# Patient Record
Sex: FEMALE | NOT HISPANIC OR LATINO | Employment: UNEMPLOYED | URBAN - NONMETROPOLITAN AREA
[De-identification: names, ages, dates, MRNs, and addresses within clinical notes are randomized per-mention and may not be internally consistent; named-entity substitution may affect disease eponyms.]

---

## 2020-06-11 ENCOUNTER — TRANSCRIBE ORDERS (OUTPATIENT)
Dept: ADMINISTRATIVE | Facility: HOSPITAL | Age: 50
End: 2020-06-11

## 2020-06-11 DIAGNOSIS — Z01.818 PREOP TESTING: Primary | ICD-10-CM

## 2021-05-26 ENCOUNTER — TRANSCRIBE ORDERS (OUTPATIENT)
Dept: ADMINISTRATIVE | Facility: HOSPITAL | Age: 51
End: 2021-05-26

## 2021-05-26 DIAGNOSIS — R10.9 ABDOMINAL PAIN, UNSPECIFIED ABDOMINAL LOCATION: Primary | ICD-10-CM

## 2024-11-23 ENCOUNTER — HOSPITAL ENCOUNTER (OUTPATIENT)
Dept: OTHER | Facility: HOSPITAL | Age: 54
Discharge: HOME | End: 2024-11-23

## 2024-11-26 ENCOUNTER — PREP FOR SURGERY (OUTPATIENT)
Dept: OTHER | Facility: HOSPITAL | Age: 54
End: 2024-11-26
Payer: MEDICARE

## 2024-11-26 ENCOUNTER — HOSPITAL ENCOUNTER (OUTPATIENT)
Facility: HOSPITAL | Age: 54
Setting detail: HOSPITAL OUTPATIENT SURGERY
Discharge: HOME | End: 2024-11-26
Attending: UROLOGY | Admitting: UROLOGY
Payer: MEDICARE

## 2024-11-26 ENCOUNTER — APPOINTMENT (OUTPATIENT)
Dept: GENERAL RADIOLOGY | Facility: HOSPITAL | Age: 54
End: 2024-11-26
Payer: MEDICARE

## 2024-11-26 ENCOUNTER — HOSPITAL ENCOUNTER (OUTPATIENT)
Dept: OTHER | Facility: HOSPITAL | Age: 54
Setting detail: HOSPITAL OUTPATIENT SURGERY
Discharge: HOME | End: 2024-11-26
Payer: MEDICARE

## 2024-11-26 ENCOUNTER — ANESTHESIA EVENT (OUTPATIENT)
Dept: PERIOP | Facility: HOSPITAL | Age: 54
End: 2024-11-26
Payer: MEDICARE

## 2024-11-26 ENCOUNTER — ANESTHESIA (OUTPATIENT)
Dept: PERIOP | Facility: HOSPITAL | Age: 54
End: 2024-11-26
Payer: MEDICARE

## 2024-11-26 VITALS
SYSTOLIC BLOOD PRESSURE: 149 MMHG | DIASTOLIC BLOOD PRESSURE: 88 MMHG | OXYGEN SATURATION: 100 % | BODY MASS INDEX: 41.32 KG/M2 | HEIGHT: 65 IN | TEMPERATURE: 97.6 F | RESPIRATION RATE: 16 BRPM | WEIGHT: 248 LBS | HEART RATE: 73 BPM

## 2024-11-26 DIAGNOSIS — N20.1 LEFT URETERAL STONE: Primary | ICD-10-CM

## 2024-11-26 DIAGNOSIS — N20.1 LEFT URETERAL STONE: ICD-10-CM

## 2024-11-26 PROCEDURE — 88300 SURGICAL PATH GROSS: CPT | Performed by: UROLOGY

## 2024-11-26 PROCEDURE — 25810000003 LACTATED RINGERS PER 1000 ML: Performed by: NURSE ANESTHETIST, CERTIFIED REGISTERED

## 2024-11-26 PROCEDURE — 74420 UROGRAPHY RTRGR +-KUB: CPT | Performed by: UROLOGY

## 2024-11-26 PROCEDURE — 25010000002 FENTANYL CITRATE (PF) 50 MCG/ML SOLUTION: Performed by: NURSE ANESTHETIST, CERTIFIED REGISTERED

## 2024-11-26 PROCEDURE — 25010000002 MIDAZOLAM PER 1MG: Performed by: ANESTHESIOLOGY

## 2024-11-26 PROCEDURE — 82365 CALCULUS SPECTROSCOPY: CPT | Performed by: UROLOGY

## 2024-11-26 PROCEDURE — 76000 FLUOROSCOPY <1 HR PHYS/QHP: CPT

## 2024-11-26 PROCEDURE — 25010000002 ONDANSETRON PER 1 MG

## 2024-11-26 PROCEDURE — C1758 CATHETER, URETERAL: HCPCS | Performed by: UROLOGY

## 2024-11-26 PROCEDURE — 25010000002 PROPOFOL 10 MG/ML EMULSION: Performed by: NURSE ANESTHETIST, CERTIFIED REGISTERED

## 2024-11-26 PROCEDURE — S0260 H&P FOR SURGERY: HCPCS | Performed by: UROLOGY

## 2024-11-26 PROCEDURE — 25010000002 HYDROMORPHONE PER 4 MG: Performed by: NURSE ANESTHETIST, CERTIFIED REGISTERED

## 2024-11-26 PROCEDURE — C1769 GUIDE WIRE: HCPCS | Performed by: UROLOGY

## 2024-11-26 PROCEDURE — 25010000002 SUGAMMADEX 200 MG/2ML SOLUTION

## 2024-11-26 PROCEDURE — 25010000002 DEXAMETHASONE PER 1 MG: Performed by: NURSE ANESTHETIST, CERTIFIED REGISTERED

## 2024-11-26 PROCEDURE — 25010000002 LIDOCAINE PF 2% 2 % SOLUTION: Performed by: NURSE ANESTHETIST, CERTIFIED REGISTERED

## 2024-11-26 PROCEDURE — C2617 STENT, NON-COR, TEM W/O DEL: HCPCS | Performed by: UROLOGY

## 2024-11-26 PROCEDURE — 25010000002 CEFTRIAXONE PER 250 MG: Performed by: UROLOGY

## 2024-11-26 PROCEDURE — 52356 CYSTO/URETERO W/LITHOTRIPSY: CPT | Performed by: UROLOGY

## 2024-11-26 PROCEDURE — 25810000003 LACTATED RINGERS PER 1000 ML: Performed by: ANESTHESIOLOGY

## 2024-11-26 DEVICE — URETERAL STENT
Type: IMPLANTABLE DEVICE | Site: URETER | Status: FUNCTIONAL
Brand: ASCERTA™

## 2024-11-26 RX ORDER — KETOROLAC TROMETHAMINE 30 MG/ML
30 INJECTION, SOLUTION INTRAMUSCULAR; INTRAVENOUS ONCE AS NEEDED
Status: DISCONTINUED | OUTPATIENT
Start: 2024-11-26 | End: 2024-11-27 | Stop reason: HOSPADM

## 2024-11-26 RX ORDER — ACETAMINOPHEN 325 MG/1
650 TABLET ORAL ONCE
Status: COMPLETED | OUTPATIENT
Start: 2024-11-26 | End: 2024-11-26

## 2024-11-26 RX ORDER — HYDROCODONE BITARTRATE AND ACETAMINOPHEN 7.5; 325 MG/1; MG/1
2 TABLET ORAL EVERY 6 HOURS PRN
Status: DISCONTINUED | OUTPATIENT
Start: 2024-11-26 | End: 2024-11-27 | Stop reason: HOSPADM

## 2024-11-26 RX ORDER — HYDROCODONE BITARTRATE AND ACETAMINOPHEN 7.5; 325 MG/1; MG/1
1 TABLET ORAL EVERY 6 HOURS PRN
COMMUNITY

## 2024-11-26 RX ORDER — OXYCODONE AND ACETAMINOPHEN 7.5; 325 MG/1; MG/1
1 TABLET ORAL ONCE AS NEEDED
Status: DISCONTINUED | OUTPATIENT
Start: 2024-11-26 | End: 2024-11-26 | Stop reason: HOSPADM

## 2024-11-26 RX ORDER — HYDROMORPHONE HYDROCHLORIDE 1 MG/ML
0.5 INJECTION, SOLUTION INTRAMUSCULAR; INTRAVENOUS; SUBCUTANEOUS
Status: DISCONTINUED | OUTPATIENT
Start: 2024-11-26 | End: 2024-11-26 | Stop reason: HOSPADM

## 2024-11-26 RX ORDER — PHENAZOPYRIDINE HYDROCHLORIDE 100 MG/1
100 TABLET, FILM COATED ORAL 3 TIMES DAILY PRN
Status: DISCONTINUED | OUTPATIENT
Start: 2024-11-26 | End: 2024-11-27 | Stop reason: HOSPADM

## 2024-11-26 RX ORDER — LIDOCAINE HYDROCHLORIDE 20 MG/ML
INJECTION, SOLUTION EPIDURAL; INFILTRATION; INTRACAUDAL; PERINEURAL AS NEEDED
Status: DISCONTINUED | OUTPATIENT
Start: 2024-11-26 | End: 2024-11-26 | Stop reason: SURG

## 2024-11-26 RX ORDER — IBUPROFEN 600 MG/1
600 TABLET, FILM COATED ORAL EVERY 6 HOURS PRN
Status: DISCONTINUED | OUTPATIENT
Start: 2024-11-26 | End: 2024-11-26 | Stop reason: HOSPADM

## 2024-11-26 RX ORDER — ONDANSETRON 2 MG/ML
4 INJECTION INTRAMUSCULAR; INTRAVENOUS ONCE AS NEEDED
Status: DISCONTINUED | OUTPATIENT
Start: 2024-11-26 | End: 2024-11-26 | Stop reason: HOSPADM

## 2024-11-26 RX ORDER — OXYCODONE HYDROCHLORIDE 5 MG/1
5 TABLET ORAL
Status: DISCONTINUED | OUTPATIENT
Start: 2024-11-26 | End: 2024-11-26 | Stop reason: HOSPADM

## 2024-11-26 RX ORDER — TAMSULOSIN HYDROCHLORIDE 0.4 MG/1
1 CAPSULE ORAL DAILY
Qty: 14 CAPSULE | Refills: 0 | Status: SHIPPED | OUTPATIENT
Start: 2024-11-26

## 2024-11-26 RX ORDER — PHENAZOPYRIDINE HYDROCHLORIDE 200 MG/1
200 TABLET, FILM COATED ORAL 3 TIMES DAILY PRN
Qty: 20 TABLET | Refills: 0 | Status: SHIPPED | OUTPATIENT
Start: 2024-11-26

## 2024-11-26 RX ORDER — MAGNESIUM HYDROXIDE 1200 MG/15ML
LIQUID ORAL AS NEEDED
Status: DISCONTINUED | OUTPATIENT
Start: 2024-11-26 | End: 2024-11-26 | Stop reason: HOSPADM

## 2024-11-26 RX ORDER — BUSPIRONE HYDROCHLORIDE 10 MG/1
50 TABLET ORAL ONCE
COMMUNITY
End: 2024-12-02 | Stop reason: SDUPTHER

## 2024-11-26 RX ORDER — PROPOFOL 10 MG/ML
VIAL (ML) INTRAVENOUS AS NEEDED
Status: DISCONTINUED | OUTPATIENT
Start: 2024-11-26 | End: 2024-11-26 | Stop reason: SURG

## 2024-11-26 RX ORDER — ONDANSETRON 2 MG/ML
4 INJECTION INTRAMUSCULAR; INTRAVENOUS ONCE AS NEEDED
Status: DISCONTINUED | OUTPATIENT
Start: 2024-11-26 | End: 2024-11-27 | Stop reason: HOSPADM

## 2024-11-26 RX ORDER — PROMETHAZINE HYDROCHLORIDE 12.5 MG/1
12.5 TABLET ORAL ONCE AS NEEDED
Status: DISCONTINUED | OUTPATIENT
Start: 2024-11-26 | End: 2024-11-27 | Stop reason: HOSPADM

## 2024-11-26 RX ORDER — SODIUM CHLORIDE, SODIUM LACTATE, POTASSIUM CHLORIDE, CALCIUM CHLORIDE 600; 310; 30; 20 MG/100ML; MG/100ML; MG/100ML; MG/100ML
INJECTION, SOLUTION INTRAVENOUS CONTINUOUS PRN
Status: DISCONTINUED | OUTPATIENT
Start: 2024-11-26 | End: 2024-11-26 | Stop reason: SURG

## 2024-11-26 RX ORDER — SCOLOPAMINE TRANSDERMAL SYSTEM 1 MG/1
1 PATCH, EXTENDED RELEASE TRANSDERMAL ONCE
Status: DISCONTINUED | OUTPATIENT
Start: 2024-11-26 | End: 2024-11-27 | Stop reason: HOSPADM

## 2024-11-26 RX ORDER — FENTANYL CITRATE 50 UG/ML
INJECTION, SOLUTION INTRAMUSCULAR; INTRAVENOUS AS NEEDED
Status: DISCONTINUED | OUTPATIENT
Start: 2024-11-26 | End: 2024-11-26 | Stop reason: SURG

## 2024-11-26 RX ORDER — HYDROMORPHONE HYDROCHLORIDE 1 MG/ML
0.25 INJECTION, SOLUTION INTRAMUSCULAR; INTRAVENOUS; SUBCUTANEOUS
Status: DISCONTINUED | OUTPATIENT
Start: 2024-11-26 | End: 2024-11-26 | Stop reason: HOSPADM

## 2024-11-26 RX ORDER — ALBUTEROL SULFATE 90 UG/1
2 INHALANT RESPIRATORY (INHALATION) EVERY 6 HOURS PRN
COMMUNITY

## 2024-11-26 RX ORDER — MEPERIDINE HYDROCHLORIDE 25 MG/ML
12.5 INJECTION INTRAMUSCULAR; INTRAVENOUS; SUBCUTANEOUS
Status: DISCONTINUED | OUTPATIENT
Start: 2024-11-26 | End: 2024-11-26 | Stop reason: HOSPADM

## 2024-11-26 RX ORDER — ROCURONIUM BROMIDE 10 MG/ML
INJECTION, SOLUTION INTRAVENOUS AS NEEDED
Status: DISCONTINUED | OUTPATIENT
Start: 2024-11-26 | End: 2024-11-26 | Stop reason: SURG

## 2024-11-26 RX ORDER — OXYCODONE HYDROCHLORIDE 5 MG/1
5-10 TABLET ORAL EVERY 6 HOURS PRN
Qty: 14 TABLET | Refills: 0 | Status: SHIPPED | OUTPATIENT
Start: 2024-11-26

## 2024-11-26 RX ORDER — PROMETHAZINE HYDROCHLORIDE 25 MG/1
25 SUPPOSITORY RECTAL ONCE AS NEEDED
Status: DISCONTINUED | OUTPATIENT
Start: 2024-11-26 | End: 2024-11-26 | Stop reason: HOSPADM

## 2024-11-26 RX ORDER — SODIUM CHLORIDE, SODIUM LACTATE, POTASSIUM CHLORIDE, CALCIUM CHLORIDE 600; 310; 30; 20 MG/100ML; MG/100ML; MG/100ML; MG/100ML
9 INJECTION, SOLUTION INTRAVENOUS ONCE
Status: COMPLETED | OUTPATIENT
Start: 2024-11-26 | End: 2024-11-26

## 2024-11-26 RX ORDER — MIDAZOLAM HYDROCHLORIDE 2 MG/2ML
2 INJECTION, SOLUTION INTRAMUSCULAR; INTRAVENOUS ONCE
Status: COMPLETED | OUTPATIENT
Start: 2024-11-26 | End: 2024-11-26

## 2024-11-26 RX ORDER — OXYCODONE HYDROCHLORIDE 5 MG/1
5 TABLET ORAL EVERY 4 HOURS PRN
Status: DISCONTINUED | OUTPATIENT
Start: 2024-11-26 | End: 2024-11-27 | Stop reason: HOSPADM

## 2024-11-26 RX ORDER — OXYBUTYNIN CHLORIDE 5 MG/1
5 TABLET ORAL 3 TIMES DAILY PRN
Qty: 12 TABLET | Refills: 0 | Status: SHIPPED | OUTPATIENT
Start: 2024-11-26

## 2024-11-26 RX ORDER — MELOXICAM 15 MG/1
15 TABLET ORAL DAILY
COMMUNITY

## 2024-11-26 RX ORDER — ONDANSETRON 2 MG/ML
INJECTION INTRAMUSCULAR; INTRAVENOUS AS NEEDED
Status: DISCONTINUED | OUTPATIENT
Start: 2024-11-26 | End: 2024-11-26 | Stop reason: SURG

## 2024-11-26 RX ORDER — ONDANSETRON 4 MG/1
4 TABLET, ORALLY DISINTEGRATING ORAL ONCE AS NEEDED
Status: DISCONTINUED | OUTPATIENT
Start: 2024-11-26 | End: 2024-11-27 | Stop reason: HOSPADM

## 2024-11-26 RX ORDER — DEXAMETHASONE SODIUM PHOSPHATE 4 MG/ML
INJECTION, SOLUTION INTRA-ARTICULAR; INTRALESIONAL; INTRAMUSCULAR; INTRAVENOUS; SOFT TISSUE AS NEEDED
Status: DISCONTINUED | OUTPATIENT
Start: 2024-11-26 | End: 2024-11-26 | Stop reason: SURG

## 2024-11-26 RX ORDER — PROMETHAZINE HYDROCHLORIDE 12.5 MG/1
25 TABLET ORAL ONCE AS NEEDED
Status: DISCONTINUED | OUTPATIENT
Start: 2024-11-26 | End: 2024-11-26 | Stop reason: HOSPADM

## 2024-11-26 RX ORDER — OXYBUTYNIN CHLORIDE 5 MG/1
5 TABLET ORAL 3 TIMES DAILY PRN
Status: DISCONTINUED | OUTPATIENT
Start: 2024-11-26 | End: 2024-11-27 | Stop reason: HOSPADM

## 2024-11-26 RX ORDER — SERTRALINE HYDROCHLORIDE 100 MG/1
100 TABLET, FILM COATED ORAL DAILY
COMMUNITY
End: 2024-12-02 | Stop reason: SDUPTHER

## 2024-11-26 RX ORDER — FUROSEMIDE 40 MG/1
40 TABLET ORAL AS NEEDED
COMMUNITY

## 2024-11-26 RX ADMIN — OXYCODONE 5 MG: 5 TABLET ORAL at 21:51

## 2024-11-26 RX ADMIN — SCOPALAMINE 1 PATCH: 1 PATCH, EXTENDED RELEASE TRANSDERMAL at 17:58

## 2024-11-26 RX ADMIN — SODIUM CHLORIDE, POTASSIUM CHLORIDE, SODIUM LACTATE AND CALCIUM CHLORIDE: 600; 310; 30; 20 INJECTION, SOLUTION INTRAVENOUS at 18:02

## 2024-11-26 RX ADMIN — SUGAMMADEX 200 MG: 100 INJECTION, SOLUTION INTRAVENOUS at 19:21

## 2024-11-26 RX ADMIN — OXYBUTYNIN CHLORIDE 5 MG: 5 TABLET ORAL at 20:53

## 2024-11-26 RX ADMIN — HYDROMORPHONE HYDROCHLORIDE 0.5 MG: 1 INJECTION, SOLUTION INTRAMUSCULAR; INTRAVENOUS; SUBCUTANEOUS at 19:44

## 2024-11-26 RX ADMIN — ACETAMINOPHEN 650 MG: 325 TABLET ORAL at 20:11

## 2024-11-26 RX ADMIN — LIDOCAINE HYDROCHLORIDE 60 MG: 20 INJECTION, SOLUTION INTRAVENOUS at 18:08

## 2024-11-26 RX ADMIN — FENTANYL CITRATE 50 MCG: 50 INJECTION, SOLUTION INTRAMUSCULAR; INTRAVENOUS at 18:18

## 2024-11-26 RX ADMIN — SODIUM CHLORIDE 1 G: 9 INJECTION INTRAMUSCULAR; INTRAVENOUS; SUBCUTANEOUS at 18:12

## 2024-11-26 RX ADMIN — HYDROCODONE BITARTRATE AND ACETAMINOPHEN 2 TABLET: 7.5; 325 TABLET ORAL at 22:51

## 2024-11-26 RX ADMIN — PROPOFOL 150 MG: 10 INJECTION, EMULSION INTRAVENOUS at 18:08

## 2024-11-26 RX ADMIN — ROCURONIUM BROMIDE 50 MG: 10 INJECTION, SOLUTION INTRAVENOUS at 18:08

## 2024-11-26 RX ADMIN — ONDANSETRON HYDROCHLORIDE 4 MG: 2 SOLUTION INTRAMUSCULAR; INTRAVENOUS at 19:16

## 2024-11-26 RX ADMIN — MIDAZOLAM HYDROCHLORIDE 2 MG: 1 INJECTION, SOLUTION INTRAMUSCULAR; INTRAVENOUS at 17:58

## 2024-11-26 RX ADMIN — DEXAMETHASONE SODIUM PHOSPHATE 8 MG: 4 INJECTION, SOLUTION INTRAMUSCULAR; INTRAVENOUS at 18:03

## 2024-11-26 RX ADMIN — FENTANYL CITRATE 50 MCG: 50 INJECTION, SOLUTION INTRAMUSCULAR; INTRAVENOUS at 18:08

## 2024-11-26 RX ADMIN — SODIUM CHLORIDE, SODIUM LACTATE, POTASSIUM CHLORIDE, CALCIUM CHLORIDE 9 ML/HR: 20; 30; 600; 310 INJECTION, SOLUTION INTRAVENOUS at 17:58

## 2024-11-26 NOTE — ANESTHESIA PREPROCEDURE EVALUATION
Anesthesia Evaluation     Patient summary reviewed and Nursing notes reviewed   no history of anesthetic complications:   NPO Solid Status: > 8 hours  NPO Liquid Status: > 2 hours           Airway   Mallampati: II  TM distance: >3 FB  Neck ROM: full  Dental - normal exam     Pulmonary - normal exam   (+) asthma,  Cardiovascular - normal exam  Exercise tolerance: good (4-7 METS)    (+) hypertension      Neuro/Psych- negative ROS  GI/Hepatic/Renal/Endo    (+) obesity, renal disease- stones    Musculoskeletal (-) negative ROS    Abdominal   (+) obese   Substance History - negative use     OB/GYN negative ob/gyn ROS         Other - negative ROS       ROS/Med Hx Other: PAT Nursing Notes unavailable.                     Anesthesia Plan    ASA 2     general   Rapid sequence  (Currently N/V. +ETT)  intravenous induction     Anesthetic plan, risks, benefits, and alternatives have been provided, discussed and informed consent has been obtained with: patient.    Plan discussed with CRNA.        CODE STATUS:

## 2024-11-26 NOTE — H&P
[facility]   UROLOGY Consult Note    Patient Name: Chasidy Frances  : 1970  MRN: [id number]  Primary Care Physician:  Khari Cantu CRNA  Referring Physician: Madison Hope MD  Date of admission: 2024    Subjective   Subjective     Reason for Consult/ Chief Complaint: Left ureteral stone, refractory pain    HPI:  Chasidy Frances is a 54 y.o. female presenting from outside ER after presentation for acute, severe left-sided flank pain.  Refractory pain in ER.  Patient denies fever.    Workup at outside ER reveals UA with 10-25 RBCs, no bacteria, nitrite negative, negative leukocyte esterase.  WBC 7.8; creatinine 0.9.    CT abdomen pelvis without contrast 2024 reveals a 10 mm obstructing stone in the mid left ureter with mild ureteral dilation and hydronephrosis; no other stones visualized.    Urology consulted via transfer center, patient was discharged and presents here for definitive stone management.      Review of Systems   All systems were reviewed and negative except for: The above    Personal History     No past medical history on file.    No past surgical history on file.    Family History: family history is not on file. Otherwise pertinent FHx was reviewed and not pertinent to current issue.    Social History:      Home Medications:       Allergies:  Not on File    Objective    Objective     Vitals:        Physical Exam:   No acute distress, well-nourished  Lungs: Clear, unlabored  CV: Regular rate, no chest retractions  Awake alert and oriented  Mood normal; affect normal    Result Review    Result Review:  I have personally reviewed the results from the time of this admission to 2024 17:08 EST and agree with these findings:  [x]  Laboratory  []  Microbiology  [x]  Radiology  []  EKG/Telemetry   []  Cardiology/Vascular   []  Pathology  [x]  Old records  []  Other:      Assessment & Plan   Assessment / Plan     Brief Patient Summary:  Chasidy Frances is a 54 y.o.  female who presents for surgical management of left obstructive uropathy secondary to a 10 mm mid ureteral stone with refractory symptoms at outside hospital     active Hospital Problems:  Active Hospital Problems    Diagnosis     **Left ureteral stone        Plan:   Left ureteral calculi-approximately 10 mm in size.  CT imaging reviewed and discussed with patient.  Patient with refractory symptoms at outside ER.   Fortunately, no evidence of UTI or leukocytosis.    Plan to proceed urgently with cystoscopy, left retrograde pyelogram, ureteroscopy, laser lithotripsy, stent placement.  Risks, benefits and alternatives were discussed with patient including bleeding, infection, damage to surrounding structures, stone recurrence, stricture pain, further procedures or management, and risks of anesthesia including up to death.  Patient also notes understanding that if unable to reach the level of the stone or if significant purulent discharge noted upon initiation of procedure that stent will be placed in definitive stone management will be deferred until the collecting system is optimized.    Patient participated in and notes understanding of the above discussion and is agreeable to proceed.  Anticipate likely discharge home depending on clinical course  Left side marked   Preop Rocephin   OR notified   all question addressed at this time.    Electronically signed by Madison Hope MD, 11/26/24, 5:05 PM EST.

## 2024-11-27 ENCOUNTER — TELEPHONE (OUTPATIENT)
Dept: UROLOGY | Age: 54
End: 2024-11-27
Payer: MEDICARE

## 2024-11-27 NOTE — OP NOTE
Preoperative diagnosis  Left ureteral stone     Postoperative diagnosis  Left ureteral stone    Procedure performed  Cystoscopy, bilateral retrograde pyelogram, left ureteroscopy, laser lithotripsy, ureteral stent insertion     Attending surgeon  Madison Hope MD     Anesthesia  General    EBL  0 mL    Complications  None    Specimen  Left ureteral Stone    Findings  Cystoscopy without abnormality, bilateral orthotopic ureters  Treatment of left ureteral stone with laser, fragments retrieved; no significant stone burden at conclusion of case the sediment and dust  6 x 26 ureteral stent no string    Indications  54 y.o. female agreed to undergo the above named procedure after discussion of the alternatives, risks and benefits.   Informed consent was obtained.      Procedure  After informed consent was obtained, the patient was taken back to the  operating suite where general anesthesia was administered. Once the patient  was adequately anesthetized, they were placed in the dorsal lithotomy position.  The genitals were prepped and draped in a normal sterile fashion.  Rigid  cystoscope was inserted gently in the patient's urethra meatus, which passed  atraumatically into the bladder; pan cystoscopy was performed in a typical 360-degree manner, no mucosal abnormalities were noted.  Bilateral orthotopic ureters.  Visualization initially was somewhat challenging.  Pollick catheter was initially inserted into the right ureteral orifice, retrograde pyelogram obtained which was normal without hydronephrosis, filling defect or stricture.  This allowed orientation and identification of the left ureteral orifice.  Pollack catheter was then placed in the distal left ureter and wire threaded through it into the renal pelvis.  Dual-lumen was placed over the wire.  Contrast dye was injected and filling defect noted at the mid ureter with proximal associated hydronephrosis.  Second wire was placed, the lumen reduced.  One of the  wires was secured to the drape as a safety wire for the remainder of the case.  Semirigid ureteroscope was then passed adjacent to one of the wires proximally until the stone was visualized.  Once the stone was encountered, laser  lithotripsy was then initiated.  Laser lithotripsy was utilized to dust and  fragment the calculus into pieces.  Any sizable  fragments were then basketed out systematically.  The ureter was free of stone only sediment dust remaining, the ureteroscope was then passed more proximally.  Upon reaching the proximal ureter there was no sizable fragments to retrieved.  Wire was then inserted through the ureteroscope and ureteroscope reduced.  The semirigid ureteroscope was then changed out for a flexible ureteroscope which was passed in the proximal ureter.  Contrast dye was injected to opacify the intrarenal collecting system. Nephroscopy then proceeded in a systematic manner.  Contrast dye was injected ensuring thorough inspection of all calyces.  Once there were no further stone fragments, only sediment and dust remained, ureteroscopy proceeded down the length of the ureter  with retrieval of the access sheath, leaving the safety wire in place. There only some tiny fragments too small to basket and dust within the ureter.   After withdrawing the  Ureteroscope. The wire was back loaded through  the cystoscope and 6 x 26 double J stent was placed over the wire under  direct visualization.  Upon retrieval of the wire, there was good proximal  coil noted on x-ray, as well as distal coil within the bladder. At this  point, the procedure was deemed terminated.  The patient's bladder was then emptied and the cystoscope removed.  The patient was then placed back in the supine position and awakened from general  anesthesia and transferred to the PACU in good condition.       Signed:  Madison Hope MD  11/26/24  19:24 EST

## 2024-11-27 NOTE — TELEPHONE ENCOUNTER
----- Message from Pita CASTANON sent at 11/27/2024 11:25 AM EST -----  Regarding: RE: Cystoscopy and stent removal next week  12/4 AT 1015 CYSTO/STENT REMOVAL. PLEASE CALL PT TO SCHEDULE. THANK YOU!  ----- Message -----  From: Madison Hope MD  Sent: 11/26/2024   7:41 PM EST  To: Pita Granger MA  Subject: Cystoscopy and stent removal next week           [place], needs to get set up for cystoscopy and stent removal next week.  Thanks

## 2024-11-27 NOTE — ANESTHESIA POSTPROCEDURE EVALUATION
Patient: Chasidy Frances    Procedure Summary       Date: 11/26/24 Room / Location: [facility] OR  / [facility] MAIN OR    Anesthesia Start: 1802 Anesthesia Stop: 1936    Procedure: CYSTOSCOPY, URETEROSCOPY, RETROGRADE PYELOGRAM HOLMIUM LASER STENT INSERTION, left  Plan text: Scope bladder, scope ureter, shoot x-ray, treat stone, place stent, left (Left: Bladder) Diagnosis:       Left ureteral stone      (Left ureteral stone [N20.1])    Surgeons: Madison Hope MD Provider: Pascual Lemons CRNA    Anesthesia Type: general ASA Status: 2            Anesthesia Type: general    Vitals  Vitals Value Taken Time   /89 11/26/24 1955   Temp 36.1 °C (96.9 °F) 11/26/24 1935   Pulse 82 11/26/24 1959   Resp 16 11/26/24 1950   SpO2 100 % 11/26/24 1959   Vitals shown include unfiled device data.        Post Anesthesia Care and Evaluation    Patient location during evaluation: bedside  Patient participation: complete - patient participated  Level of consciousness: awake  Pain management: adequate    Airway patency: patent  PONV Status: none  Cardiovascular status: acceptable and stable  Respiratory status: acceptable  Hydration status: acceptable

## 2024-12-02 RX ORDER — SEMAGLUTIDE 2.68 MG/ML
INJECTION, SOLUTION SUBCUTANEOUS
COMMUNITY

## 2024-12-02 RX ORDER — PROMETHAZINE HYDROCHLORIDE 25 MG/1
TABLET ORAL
COMMUNITY

## 2024-12-02 RX ORDER — SEMAGLUTIDE 1.34 MG/ML
INJECTION, SOLUTION SUBCUTANEOUS
COMMUNITY

## 2024-12-02 RX ORDER — MONTELUKAST SODIUM 10 MG/1
TABLET ORAL
COMMUNITY
Start: 2024-11-26

## 2024-12-02 RX ORDER — ONDANSETRON 4 MG/1
TABLET, ORALLY DISINTEGRATING ORAL
COMMUNITY
Start: 2024-11-26

## 2024-12-02 RX ORDER — BUSPIRONE HYDROCHLORIDE 5 MG/1
TABLET ORAL
COMMUNITY
Start: 2024-11-26

## 2024-12-03 LAB
LAB AP CASE REPORT: NORMAL
LAB AP CLINICAL INFORMATION: NORMAL
PATH REPORT.FINAL DX SPEC: NORMAL
PATH REPORT.GROSS SPEC: NORMAL

## 2024-12-04 ENCOUNTER — PROCEDURE VISIT (OUTPATIENT)
Dept: UROLOGY | Age: 54
End: 2024-12-04
Payer: MEDICARE

## 2024-12-04 VITALS — BODY MASS INDEX: 41.32 KG/M2 | WEIGHT: 248 LBS | HEIGHT: 65 IN

## 2024-12-04 DIAGNOSIS — N13.30 HYDRONEPHROSIS, UNSPECIFIED HYDRONEPHROSIS TYPE: ICD-10-CM

## 2024-12-04 DIAGNOSIS — T19.9XXA FOREIGN BODY IN GENITOURINARY TRACT, INITIAL ENCOUNTER: Primary | ICD-10-CM

## 2024-12-04 NOTE — PROGRESS NOTES
Preoperative diagnosis  Foreign body in genitourinary tract; hydronephrosis    Postoperative diagnosis  Same as above    Procedure  Flexible cystourethroscopy with stent removal    Attending surgeon  Madison Hope MD     Anesthesia  2% lidocaine jelly intraurethrally    Complications  None    Specimen  None    Estimated blood loss  0cc    Indications  54 y.o. female who is status post left ureteroscopy with stone treatment who presents for stent removal.    Procedure  The patient was appropriately identified and brought to the cytoscopy suite. A timeout was undertaken documenting the correct patient, site, and procedure. The patient was prepped in a normal sterile fashion. A flexible cytoscope was then introduced into the bladder. The stent was identified and grasped with endoscopic graspers. The entire stent was removed and passed off the field. Patient tolerated the procedure well. There were no intraprocedural complications.     Plan  Tolerated procedure well.  Instructions provided.  Patient follow-up in 4 to 6 weeks with renal ultrasound prior or earlier as needed  All question addressed

## 2024-12-07 LAB
CALCIUM OXALATE DIHYDRATE MFR STONE IR: 20 %
COLOR STONE: NORMAL
COM MFR STONE: 80 %
COMPN STONE: NORMAL
LABORATORY COMMENT REPORT: NORMAL
Lab: NORMAL
Lab: NORMAL
PHOTO: NORMAL
SIZE STONE: NORMAL MM
SPEC SOURCE SUBJ: NORMAL
WT STONE: 150 MG

## 2024-12-11 ENCOUNTER — TELEPHONE (OUTPATIENT)
Dept: UROLOGY | Age: 54
End: 2024-12-11
Payer: MEDICARE

## 2024-12-11 NOTE — TELEPHONE ENCOUNTER
PATIENT CALLED AND SAID SHE HAD EMERGENCY SURGERY FOR A 10 MM OBSTRUCTING STONE ON 11/26/24.    SHE WANTS TO KNOW IF CAN TAKE HER FUROSEMIDE 40 MG AS NEEDED.  SHE NEEDS TO TAKE ONE OR HALF OF ONE FOR FLUID RETENTION.  SHE SAID SHE WAS ALREADY ON IT, BEFORE SHE HAD SURGERY.    SHE HASN'T HAD ANY BLADDER SPASMS IN 3 DAYS, AND WANTS TO MAKE SURE SHE CAN TAKE THE MEDICATION, WITHOUT CAUSING THE ISSUE AGAIN.    #[phone]

## 2024-12-11 NOTE — TELEPHONE ENCOUNTER
Pt called back and RN notified pt is ok to continue lasix; pt is understanding and agreeable; no further questions at this time.

## 2024-12-30 ENCOUNTER — TELEPHONE (OUTPATIENT)
Dept: UROLOGY | Age: 54
End: 2024-12-30

## 2024-12-30 NOTE — TELEPHONE ENCOUNTER
Caller: Chasidy Frances     Relationship: SELF    Best call back number: [phone]     What is the best time to reach you: ANYTIME     Who are you requesting to speak with (clinical staff, provider,  specific staff member): CLINICAL    Do you know the name of the person who called: INCOMING CALL    What was the call regarding: PT HAS A CONFLICT AND IS NEEDING TO RESCHEDULE HER APPT. SHE IS DOUBLE BOOKED THAT WEEK WITH APPTS FOR HERSELF AND OTHER FAMILY MEMBERS.     PT NOTED THAT SHE IS HAVING PAIN ON HER RIGHT SIDE, IN THE SAME AREA THAT SHE HAD IT ON THE LEFT SIDE. SHE'S STILL DRINKING GALLONS OF WATER. SHE IS ASKING IF HER RENAL US WILL COVER BOTH SIDES.     PT HAS NOT BEEN CONTACTED BY CENTRAL SCHEDULING TO SCHEDULE, THEIR NUMBER WAS PROVIDED TO PT.    PT ASKED IF THE ORDER CAN BE SENT TO [place] SO SHE CAN HAVE IT DONE THERE.     PLEASE REVIEW AND GIVE PT A CALL BACK.    Is it okay if the provider responds through MyChart: NO

## 2025-01-17 ENCOUNTER — HOSPITAL ENCOUNTER (OUTPATIENT)
Dept: OTHER | Facility: HOSPITAL | Age: 55
Discharge: HOME | End: 2025-01-17
Payer: MEDICARE

## 2025-01-17 DIAGNOSIS — N13.30 HYDRONEPHROSIS, UNSPECIFIED HYDRONEPHROSIS TYPE: ICD-10-CM

## 2025-01-20 DIAGNOSIS — N13.30 HYDRONEPHROSIS, UNSPECIFIED HYDRONEPHROSIS TYPE: Primary | ICD-10-CM

## 2025-01-22 ENCOUNTER — TELEPHONE (OUTPATIENT)
Dept: UROLOGY | Age: 55
End: 2025-01-22
Payer: MEDICARE

## 2025-01-22 NOTE — TELEPHONE ENCOUNTER
SPOKE TO PT AND ADVISED THAT I R/S HER APPT AS REQUESTED. PT EXPRESSED UNDERSTANDING AND IS AGREEABLE.

## 2025-01-22 NOTE — TELEPHONE ENCOUNTER
Caller: Chasidy Frances    Relationship to patient: Self    Best call back number: [phone]    Chief complaint: 6WK FU, WITH US    Type of visit: FOLLOW UP    Requested date: 1/29/25 @ 11     If rescheduling, when is the original appointment: 1/23/25     Additional notes:PT RECVD APPT REMINDER FOR 1/23/25, HOWEVER PT SAID SHE SPK TO SOMEONE AND APPT WAS SUPPOSED TO HAVE BEEN MOVED TO 1/29/25 @ 11AM.     PLEASE REVIEW AND CALL PT TO CONFIRM APPT.  OK TO LVM.

## 2025-01-28 NOTE — PROGRESS NOTES
UROLOGY OFFICE FOLLOW UP NOTE    Subjective   HPI  Chasidy Frances is a 54 y.o. female.  Presents for follow-up status post left ureteroscopy, stone treatment, 11/26/2024.  Patient subsequently underwent stent removal in office and presents with renal ultrasound prior to today's appointment.  Patient states that she has been doing well since the procedure.  Currently denies any urinary symptoms.  Denies hematuria or flank pain.  No complaints today.    States her father only has 1 kidney; some concerns regarding monitoring overall kidney health.    History of Present Illness      ___________  Ureteroscopy, stone treatment 11/26/2024    Stone analysis 11/26/2024: 100% calcium oxalate    Renal ultrasound 1/17/2025: Normal ultrasound of the kidneys and bladder    CT abdomen pelvis without contrast 11/26/2024 reveals a 10 mm obstructing stone in the mid left ureter with mild ureteral dilation and hydronephrosis; no other stones visualized.     Results for orders placed or performed during the hospital encounter of 11/26/24   STONE ANALYSIS - Calculus, Ureter, Left    Collection Time: 11/26/24  7:17 PM    Specimen: Ureter, Left; Calculus   Result Value Ref Range    Stone Source Comment     Color Brown     Size 4x3 mm    Stone Weight 150 mg    Composition Comment     Ca Oxalate - Monohydrate, Stone 80 %    Ca Oxalate-Dihydrate, Stone 20 %    Photo Comment     Comments: Comment     Please note Comment     Disclaimer: Comment    Tissue Pathology Exam    Collection Time: 11/26/24  7:17 PM    Specimen: Ureter, Left; Calculus   Result Value Ref Range    Case Report       Surgical Pathology Report                         Case: [id number]                                  Authorizing Provider:  Madison Hope MD      Collected:           11/26/2024 07:17 PM          Ordering Location:     [facility] MAIN Received:            11/29/2024 09:08 AM                                 OR                                     "                                       Pathologist:           Mathew Aguilar MD                                                            Specimen:    Ureter, Left, Left Ureteral Stone                                                          Clinical Information       Left ureteral stone      Final Diagnosis       Left ureter, stone, removal:   - Stone, sent for chemical analysis (gross only diagnosis)         Gross Description       1. Ureter, Left.  Received fresh and labeled \" left ureteral stone\" is a 0.7 x 0.7 x 0.7 cm aggregate of brown, friable renal stones.  The specimen is submitted en toto for chemical analysis following gross examination by staff pathologist Mathew Aguilar.   IAN           Review of systems  A review of systems was performed, and positive findings are noted in the HPI.    Objective     Vital Signs:   Resp 14   Ht 165.1 cm (65\")   Wt 112 kg (246 lb 14.6 oz)   BMI 41.09 kg/m²       Physical exam  No acute distress, well-nourished  Awake alert and oriented  Mood normal; affect normal  Physical Exam        Problem List:  Patient Active Problem List   Diagnosis    Left ureteral stone       Assessment & Plan   Diagnoses and all orders for this visit:    1. Calcium oxalate calculus (Primary)        Assessment & Plan       Renal ultrasound imaging reviewed and discussed with patient at length, no hydronephrosis, or evidence of residual stone.  No further intervention necessary. Consider patient stone free at this time.      Calcium oxalate stone- Discussed dietary modifications for prevention of stone growth and recurrence including increased hydration, decrease sodium, normal calcium, low animal protein diet.      Informational handouts provided    Provided reassurance from urology standpoint.  Encouraged her to follow-up with PCP for routine lab evaluation, monitoring of overall renal health    All questions addressed     Patient to follow-up as needed           Patient or patient " representative verbalized consent for the use of Ambient Listening during the visit with  Madison Hope MD for chart documentation. 1/29/2025  14:36 EST

## 2025-01-29 ENCOUNTER — OFFICE VISIT (OUTPATIENT)
Dept: UROLOGY | Age: 55
End: 2025-01-29
Payer: MEDICARE

## 2025-01-29 VITALS — WEIGHT: 246.91 LBS | RESPIRATION RATE: 14 BRPM | HEIGHT: 65 IN | BODY MASS INDEX: 41.14 KG/M2

## 2025-01-29 DIAGNOSIS — E83.59 CALCIUM OXALATE CALCULUS: Primary | ICD-10-CM

## (undated) DEVICE — TOWEL,OR,DSP,ST,BLUE,STD,4/PK,20PK/CS: Brand: MEDLINE

## (undated) DEVICE — CYSTO PACK: Brand: MEDLINE INDUSTRIES, INC.

## (undated) DEVICE — Y-TYPE TUR/BLADDER IRRIGATION SET, REGULATING CLAMP

## (undated) DEVICE — CATH URETRL OPEN END W/CONNECT 5F 70CM

## (undated) DEVICE — SYS IRR PUMP SGL ACTN VAC SYR 10CC

## (undated) DEVICE — FIBR LASR HOLMIUM EMPOWER 365MH DISP EA/5

## (undated) DEVICE — BASIC SINGLE BASIN-LF: Brand: MEDLINE INDUSTRIES, INC.

## (undated) DEVICE — GW ULTRATRACK HYBRID STR/TP .035IN 3X150CM EA/5

## (undated) DEVICE — CATH URETRL FLXITP POLLACK STD 5F 70CM

## (undated) DEVICE — SOL IRR NACL 0.9PCT 3000ML

## (undated) DEVICE — Device

## (undated) DEVICE — FIBR LASR HOLMIUM COMPAT 272MH DISP

## (undated) DEVICE — SKIN PREP TRAY W/CHG: Brand: MEDLINE INDUSTRIES, INC.

## (undated) DEVICE — GLOVE,SURG,SENSICARE SLT,LF,PF,7: Brand: MEDLINE

## (undated) DEVICE — NITINOL STONE RETRIEVAL BASKET: Brand: ESCAPE